# Patient Record
Sex: FEMALE | Race: WHITE | NOT HISPANIC OR LATINO | URBAN - METROPOLITAN AREA
[De-identification: names, ages, dates, MRNs, and addresses within clinical notes are randomized per-mention and may not be internally consistent; named-entity substitution may affect disease eponyms.]

---

## 2018-10-02 ENCOUNTER — OFFICE VISIT (OUTPATIENT)
Dept: NEUROLOGY | Facility: CLINIC | Age: 28
End: 2018-10-02

## 2018-10-02 ENCOUNTER — LAB (OUTPATIENT)
Dept: LAB | Facility: HOSPITAL | Age: 28
End: 2018-10-02

## 2018-10-02 VITALS
DIASTOLIC BLOOD PRESSURE: 78 MMHG | WEIGHT: 220 LBS | RESPIRATION RATE: 16 BRPM | HEIGHT: 67 IN | SYSTOLIC BLOOD PRESSURE: 122 MMHG | BODY MASS INDEX: 34.53 KG/M2

## 2018-10-02 DIAGNOSIS — G51.0 BELL PALSY: ICD-10-CM

## 2018-10-02 DIAGNOSIS — G51.0 BELL PALSY: Primary | ICD-10-CM

## 2018-10-02 LAB — ERYTHROCYTE [SEDIMENTATION RATE] IN BLOOD: 22 MM/HR (ref 0–20)

## 2018-10-02 PROCEDURE — 99245 OFF/OP CONSLTJ NEW/EST HI 55: CPT | Performed by: PSYCHIATRY & NEUROLOGY

## 2018-10-02 PROCEDURE — 82164 ANGIOTENSIN I ENZYME TEST: CPT

## 2018-10-02 PROCEDURE — 86430 RHEUMATOID FACTOR TEST QUAL: CPT

## 2018-10-02 PROCEDURE — 36415 COLL VENOUS BLD VENIPUNCTURE: CPT

## 2018-10-02 PROCEDURE — 85652 RBC SED RATE AUTOMATED: CPT

## 2018-10-02 PROCEDURE — 86038 ANTINUCLEAR ANTIBODIES: CPT

## 2018-10-02 PROCEDURE — 86617 LYME DISEASE ANTIBODY: CPT

## 2018-10-02 NOTE — PROGRESS NOTES
Subjective   Patient ID: Lisa Gamboa is a 28 y.o. female     Chief Complaint   Patient presents with   • BELL'S PALSY     Dx 8/15/2018   • OPTIC NEURITIS     Dx 12/2014        History of Present Illness    28 y.o. female referred by Dr Mundo Diaz for facial weakness.  8/15/18 developed tingling on left side of face and noticed trouble drinking.  Then later in day after a nap left side of face did not move.  Hearing changes in left ear.  A few days of pain in left occiput.  Keeping lubrication gel in OS.      Taste has changed.     Previous OD ON 2014 vision returned to normal in a few weeks.      MRI Brain, my review of films, 1/31/15 normal  HCT 8/15/18 normal  Labs - CBC, HCG,     Reviewed Dr Diaz notes:    Presented to Ed on 8/15/18 for left facial weakness.  Dx with Bell's palsy and treated with prednisone, acyclovir for 5 days.  Then Valtrex added.    Past Medical History:   Diagnosis Date   • Optic neuritis      Family History   Problem Relation Age of Onset   • Diabetes Mother    • Alzheimer's disease Father    • Alzheimer's disease Maternal Grandmother    • Alzheimer's disease Maternal Grandfather    • Bipolar disorder Maternal Grandfather      Social History     Social History   • Marital status: Single     Social History Main Topics   • Smoking status: Never Smoker   • Alcohol use Yes   • Drug use: No   • Sexual activity: Defer     Other Topics Concern   • Not on file       Review of Systems   Constitutional: Negative for activity change, fatigue and unexpected weight change.   HENT: Negative for tinnitus and trouble swallowing.    Eyes: Positive for visual disturbance. Negative for photophobia.   Respiratory: Negative for apnea, cough and choking.    Cardiovascular: Negative for leg swelling.   Gastrointestinal: Negative for nausea and vomiting.   Endocrine: Negative for cold intolerance and heat intolerance.   Genitourinary: Negative for difficulty urinating, frequency, menstrual problem and  "urgency.   Musculoskeletal: Negative for back pain, gait problem, myalgias and neck pain.   Skin: Negative for color change and rash.   Allergic/Immunologic: Negative for immunocompromised state.   Neurological: Positive for facial asymmetry. Negative for dizziness, tremors, seizures, syncope, speech difficulty, weakness, light-headedness, numbness and headaches.   Hematological: Negative for adenopathy. Does not bruise/bleed easily.   Psychiatric/Behavioral: Negative for behavioral problems, confusion, decreased concentration, hallucinations and sleep disturbance.       Objective     Vitals:    10/02/18 1432   BP: 122/78   BP Location: Right arm   Patient Position: Sitting   Cuff Size: Adult   Resp: 16   Weight: 99.8 kg (220 lb)   Height: 170.2 cm (67\")       Neurologic Exam     Mental Status   Oriented to person, place, and time.   Registration: recalls 3 of 3 objects. Recall at 5 minutes: recalls 3 of 3 objects. Follows 3 step commands.   Attention: normal. Concentration: normal.   Speech: speech is normal   Level of consciousness: alert  Knowledge: good and consistent with education.   Able to name object. Able to read. Able to repeat. Able to write. Normal comprehension.     Cranial Nerves     CN II   Visual fields full to confrontation.   Visual acuity: normal  Right visual field deficit: none  Left visual field deficit: none     CN III, IV, VI   Pupils are equal, round, and reactive to light.  Extraocular motions are normal.   Right pupil: Shape: regular. Reactivity: brisk. Consensual response: intact.   Left pupil: Shape: regular. Reactivity: brisk. Consensual response: intact.   Nystagmus: none   Diplopia: none  Ophthalmoparesis: none  Upgaze: normal  Downgaze: normal  Conjugate gaze: present  Vestibulo-ocular reflex: present    CN V   Facial sensation intact.   Right corneal reflex: normal  Left corneal reflex: normal    CN VII   Right facial weakness: none  Left facial weakness: peripheral    CN VIII "   Hearing: intact    CN IX, X   Palate: symmetric  Right gag reflex: normal  Left gag reflex: normal    CN XI   Right sternocleidomastoid strength: normal  Left sternocleidomastoid strength: normal    CN XII   Tongue: not atrophic  Fasciculations: absent  Tongue deviation: none    Motor Exam   Muscle bulk: normal  Overall muscle tone: normal  Right arm tone: normal  Left arm tone: normal  Right leg tone: normal  Left leg tone: normal    Strength   Strength 5/5 throughout.     Sensory Exam   Light touch normal.   Vibration normal.   Proprioception normal.   Pinprick normal.     Gait, Coordination, and Reflexes     Gait  Gait: normal    Coordination   Romberg: negative  Finger to nose coordination: normal  Heel to shin coordination: normal  Tandem walking coordination: normal    Tremor   Resting tremor: absent  Intention tremor: absent  Action tremor: absent    Reflexes   Reflexes 2+ except as noted.       Physical Exam   Constitutional: She is oriented to person, place, and time. Vital signs are normal. She appears well-developed and well-nourished.   HENT:   Head: Normocephalic and atraumatic.   Eyes: Pupils are equal, round, and reactive to light. EOM and lids are normal.   Fundoscopic exam:       The right eye shows no exudate, no hemorrhage and no papilledema. The right eye shows venous pulsations.        The left eye shows no exudate, no hemorrhage and no papilledema. The left eye shows venous pulsations.   Neck: Normal range of motion and phonation normal. Normal carotid pulses present. Carotid bruit is not present. No thyroid mass and no thyromegaly present.   Cardiovascular: Normal rate, regular rhythm and normal heart sounds.    Pulmonary/Chest: Effort normal.   Neurological: She is oriented to person, place, and time. She has normal strength. She has a normal Finger-Nose-Finger Test, a normal Heel to Shin Test, a normal Romberg Test and a normal Tandem Gait Test. Gait normal.   Skin: Skin is warm and dry.    Psychiatric: She has a normal mood and affect. Her speech is normal.   Nursing note and vitals reviewed.      No results found for any previous visit.         Assessment/Plan     Problem List Items Addressed This Visit        Nervous and Auditory    Bell palsy - Primary    Current Assessment & Plan     Previous OD ON and recent left peripheral seventh nerve palsy    Screen for underlying auto immune disorders          Relevant Orders    Antinuclear Antibody With Reflex Cascade    Angiotensin Converting Enzyme    Sedimentation Rate    Rheumatoid Factor    Lyme Disease, Western Blot             No Follow-up on file.

## 2018-10-02 NOTE — ASSESSMENT & PLAN NOTE
Previous OD ON and recent left peripheral seventh nerve palsy    Screen for underlying auto immune disorders

## 2018-10-03 LAB — RHEUMATOID FACT SERPL-ACNC: NEGATIVE [IU]/ML

## 2018-10-04 LAB
ACE SERPL-CCNC: 20 U/L (ref 14–82)
ANA SER QL: NEGATIVE
Lab: NORMAL

## 2018-10-09 LAB
B BURGDOR IGG PATRN SER IB-IMP: NEGATIVE
B BURGDOR IGM PATRN SER IB-IMP: NEGATIVE
B BURGDOR18KD IGG SER QL IB: NORMAL
B BURGDOR23KD IGG SER QL IB: NORMAL
B BURGDOR23KD IGM SER QL IB: NORMAL
B BURGDOR28KD IGG SER QL IB: NORMAL
B BURGDOR30KD IGG SER QL IB: NORMAL
B BURGDOR39KD IGG SER QL IB: NORMAL
B BURGDOR39KD IGM SER QL IB: NORMAL
B BURGDOR41KD IGG SER QL IB: NORMAL
B BURGDOR41KD IGM SER QL IB: NORMAL
B BURGDOR45KD IGG SER QL IB: NORMAL
B BURGDOR58KD IGG SER QL IB: NORMAL
B BURGDOR66KD IGG SER QL IB: NORMAL
B BURGDOR93KD IGG SER QL IB: NORMAL

## 2018-10-24 ENCOUNTER — OFFICE VISIT (OUTPATIENT)
Dept: NEUROLOGY | Facility: CLINIC | Age: 28
End: 2018-10-24

## 2018-10-24 VITALS
BODY MASS INDEX: 34.06 KG/M2 | WEIGHT: 217 LBS | HEIGHT: 67 IN | RESPIRATION RATE: 18 BRPM | SYSTOLIC BLOOD PRESSURE: 118 MMHG | DIASTOLIC BLOOD PRESSURE: 72 MMHG

## 2018-10-24 DIAGNOSIS — G51.0 BELL PALSY: Primary | ICD-10-CM

## 2018-10-24 PROCEDURE — 99213 OFFICE O/P EST LOW 20 MIN: CPT | Performed by: PSYCHIATRY & NEUROLOGY

## 2018-10-24 NOTE — PROGRESS NOTES
Subjective   Patient ID: Lisa Gamboa is a 28 y.o. female     Chief Complaint   Patient presents with   • Michelle Palsy        History of Present Illness    28 y.o. female returns in follow for facial weakness.  Last visit on 10/2/18 ordered labs.      Labs reviewed NCS    Slight improvement in movement of the left facial muscles.     Problem history:    8/15/18 developed tingling on left side of face and noticed trouble drinking.  Then later in day after a nap left side of face did not move.  Hearing changes in left ear.  A few days of pain in left occiput.  Keeping lubrication gel in OS.      Taste has changed.     Previous OD ON 2014 vision returned to normal in a few weeks.      MRI Brain, my review of films, 1/31/15 normal  HCT 8/15/18 normal  Labs - CBC, HCG,     Reviewed Dr Diaz notes:    Presented to Ed on 8/15/18 for left facial weakness.  Dx with Bell's palsy and treated with prednisone, acyclovir for 5 days.  Then Valtrex added.      Past Medical History:   Diagnosis Date   • Bell palsy    • Optic neuritis      Family History   Problem Relation Age of Onset   • Diabetes Mother    • Alzheimer's disease Father    • Alzheimer's disease Maternal Grandmother    • Alzheimer's disease Maternal Grandfather    • Bipolar disorder Maternal Grandfather      Social History     Social History   • Marital status: Single     Social History Main Topics   • Smoking status: Never Smoker   • Alcohol use Yes   • Drug use: No   • Sexual activity: Defer     Other Topics Concern   • Not on file       Review of Systems   Constitutional: Negative for activity change, fatigue and unexpected weight change.   HENT: Negative for tinnitus and trouble swallowing.    Eyes: Positive for visual disturbance. Negative for photophobia.   Respiratory: Negative for apnea, cough and choking.    Cardiovascular: Negative for leg swelling.   Gastrointestinal: Negative for nausea and vomiting.   Endocrine: Negative for cold intolerance and heat  "intolerance.   Genitourinary: Negative for difficulty urinating, frequency, menstrual problem and urgency.   Musculoskeletal: Negative for back pain, gait problem, myalgias and neck pain.   Skin: Negative for color change and rash.   Allergic/Immunologic: Negative for immunocompromised state.   Neurological: Positive for facial asymmetry. Negative for dizziness, tremors, seizures, syncope, speech difficulty, weakness, light-headedness, numbness and headaches.   Hematological: Negative for adenopathy. Does not bruise/bleed easily.   Psychiatric/Behavioral: Negative for behavioral problems, confusion, decreased concentration, hallucinations and sleep disturbance.       Objective     Vitals:    10/24/18 1459   BP: 118/72   BP Location: Left arm   Patient Position: Sitting   Cuff Size: Adult   Resp: 18   Weight: 98.4 kg (217 lb)   Height: 170.2 cm (67\")       Neurologic Exam     Mental Status   Registration: recalls 3 of 3 objects. Recall at 5 minutes: recalls 3 of 3 objects. Follows 3 step commands.   Attention: normal. Concentration: normal.   Level of consciousness: alert  Knowledge: good and consistent with education.   Able to name object. Able to read. Able to repeat. Able to write. Normal comprehension.     Cranial Nerves     CN II   Visual fields full to confrontation.   Visual acuity: normal  Right visual field deficit: none  Left visual field deficit: none     CN III, IV, VI   Right pupil: Shape: regular. Reactivity: brisk. Consensual response: intact.   Left pupil: Shape: regular. Reactivity: brisk. Consensual response: intact.   Nystagmus: none   Diplopia: none  Ophthalmoparesis: none  Upgaze: normal  Downgaze: normal  Conjugate gaze: present  Vestibulo-ocular reflex: present    CN V   Facial sensation intact.   Right corneal reflex: normal  Left corneal reflex: normal    CN VII   Right facial weakness: none  Left facial weakness: peripheral    CN VIII   Hearing: intact    CN IX, X   Palate: symmetric  Right " gag reflex: normal  Left gag reflex: normal    CN XI   Right sternocleidomastoid strength: normal  Left sternocleidomastoid strength: normal    CN XII   Tongue: not atrophic  Fasciculations: absent  Tongue deviation: none    Motor Exam   Muscle bulk: normal  Overall muscle tone: normal  Right arm tone: normal  Left arm tone: normal  Right leg tone: normal  Left leg tone: normal    Sensory Exam   Light touch normal.   Vibration normal.   Proprioception normal.   Pinprick normal.     Gait, Coordination, and Reflexes     Tremor   Resting tremor: absent  Intention tremor: absent  Action tremor: absent    Reflexes   Reflexes 2+ except as noted.       Physical Exam   Constitutional: She appears well-developed and well-nourished.   Nursing note and vitals reviewed.      Lab on 10/02/2018   Component Date Value Ref Range Status   • See below: 10/02/2018 Comment   Final    Comment: Autoantibody                       Disease Association  ____________________________________________________________                          Condition                  Frequency  _____________________   ________________________   _________  Antinuclear Antibody,    SLE, mixed connective  Direct (MARION-D)           tissue diseases  _____________________   ________________________   _________  dsDNA                    SLE                        40 - 60%  _____________________   ________________________   _________  Chromatin                Drug induced SLE                90%                           SLE                        48 - 97%  _____________________   ________________________   _________  SSA (Ro)                 SLE                        25 - 35%                           Sjogren's Syndrome         40 - 70%                            Lupus                 100%  _____________________   ________________________   _________  SSB (La)                 SLE                                                        10%                            Sjogren's Syndrome              30%  _____________________   _______________________    _________  Sm (anti-Smith)          SLE                        15 - 30%  _____________________   _______________________    _________  RNP                      Mixed Connective Tissue                           Disease                         95%  (U1 nRNP,                SLE                        30 - 50%  anti-ribonucleoprotein)  Polymyositis and/or                           Dermatomyositis                 20%  _____________________   ________________________   _________  Scl-70 (antiDNA          Scleroderma (diffuse)      20 - 35%  topoisomerase)           Crest                           13%  _____________________   ________________________   _________  Shannan-1                     Polymyositis and/or                           Dermatomyositis            20 - 40%  _____________________   ________________________   _________  Centromere B             Scleroderma -                            Crest                           variant                         80%  _____________________   ________________________   _________  Ribosomal P              SLE                        10 - 20%   • [facility]ION Direct 10/02/2018 Negative  Negative Final   • Angiotensin Converting Enzyme 10/02/2018 20  14 - 82 U/L Final   • Sed Rate 10/02/2018 22* 0 - 20 mm/hr Final   • Rheumatoid Factor Qualitative 10/02/2018 Negative  Negative Final   • IgG P93 Ab. 10/02/2018 Absent   Final   • IgG P66 Ab. 10/02/2018 Absent   Final   • IgG P58 Ab. 10/02/2018 Absent   Final   • IgG P45 Ab. 10/02/2018 Absent   Final   • IgG P41 Ab. 10/02/2018 Absent   Final   • IgG P39 Ab. 10/02/2018 Absent   Final   • IgG P30 Ab. 10/02/2018 Absent   Final   • IgG P28 Ab. 10/02/2018 Absent   Final   • IgG P23 Ab. 10/02/2018 Absent   Final   • IgG P18 Ab. 10/02/2018 Absent   Final   • Lyme IgG Western Blot Interpretati* 10/02/2018 Negative   Final                         Positive: 5 of the  following                                 Borrelia-specific bands:                                 18,23,28,30,39,41,45,58,                                 66, and 93.                       Negative: No bands or banding                                 patterns which do not                                 meet positive criteria.   • IgM P41 Ab. 10/02/2018 Absent   Final   • IgM P39 Ab. 10/02/2018 Absent   Final   • IgM P23 Ab. 10/02/2018 Absent   Final   • Lyme IgM Western Blot Interpretati* 10/02/2018 Negative   Final    Note: An equivocal or positive EIA result followed by a negative  Western Blot result is considered NEGATIVE. An equivocal or positive  EIA result followed by a positive Western Blot is considered POSITIVE  by the CDC.  Positive: 2 of the following bands: 23,39 or 41  Negative: No bands or banding patterns which do not meet positive  criteria.  Criteria for positivity are those recommended by CDC/ASTPHLD.  p23=Osp C, v25=[id number]  Note:  Sera from individuals with the following may cross react in the  Lyme Western Blot assays: other spirochetal diseases (periodontal  disease, leptospirosis, relapsing fever, yaws, and pinta);  connective autoimmune (Rheumatoid Arthritis and Systemic Lupus  Erythematosus and also individuals with Antinuclear Antibody);  other infections ([place] Spotted Fever; Jessica-Barr Virus,  and Cytomegalovirus).         Assessment/Plan     Problem List Items Addressed This Visit        Nervous and Auditory    Bell palsy - Primary    Current Assessment & Plan     No evidence of underlying inflammatory etiology on lab work                  Return if symptoms worsen or fail to improve.

## 2021-03-05 ENCOUNTER — IMMUNIZATION (OUTPATIENT)
Dept: VACCINE CLINIC | Facility: HOSPITAL | Age: 31
End: 2021-03-05

## 2021-03-05 PROCEDURE — 0001A: CPT | Performed by: INTERNAL MEDICINE

## 2021-03-05 PROCEDURE — 91300 HC SARSCOV02 VAC 30MCG/0.3ML IM: CPT | Performed by: INTERNAL MEDICINE

## 2021-03-25 ENCOUNTER — IMMUNIZATION (OUTPATIENT)
Dept: VACCINE CLINIC | Facility: HOSPITAL | Age: 31
End: 2021-03-25

## 2021-03-25 PROCEDURE — 0002A: CPT | Performed by: INTERNAL MEDICINE

## 2021-03-25 PROCEDURE — 91300 HC SARSCOV02 VAC 30MCG/0.3ML IM: CPT | Performed by: INTERNAL MEDICINE
